# Patient Record
(demographics unavailable — no encounter records)

---

## 2024-10-18 NOTE — DISCUSSION/SUMMARY
[Normal Growth] : growth [Normal Development] : development [None] : No known medical problems [No Elimination Concerns] : elimination [No Feeding Concerns] : feeding [No Skin Concerns] : skin [Normal Sleep Pattern] : sleep [Family Support] : family support [Establishing Routines] : establishing routines [Feeding and Appetite Changes] : feeding and appetite changes [Establishing A Dental Home] : establishing a dental home [Safety] : safety [No Medications] : ~He/She~ is not on any medications [Parent/Guardian] : parent/guardian [] : The components of the vaccine(s) to be administered today are listed in the plan of care. The disease(s) for which the vaccine(s) are intended to prevent and the risks have been discussed with the caretaker.  The risks are also included in the appropriate vaccination information statements which have been provided to the patient's caregiver.  The caregiver has given consent to vaccinate. [FreeTextEntry1] : Transition to whole cow's milk. Continue table foods, 3 meals with 2-3 snacks per day. Incorporate up to 6 oz of flourinated water daily in a sippy cup. Brush teeth twice a day with soft toothbrush. Recommend visit to dentist. When in car, keep child in rear-facing car seats until age 2, or until  the maximum height and weight for seat is reached. Put baby to sleep in own crib with no loose or soft bedding. Lower crib matress. Help baby to maintain consistent daily routines and sleep schedule. Recognize stranger and separation anxiety. Ensure home is safe since baby is increasingly mobile. Be within arm's reach of baby at all times. Use consistent, positive discipline. Avoid screen time. Read aloud to baby.

## 2024-10-18 NOTE — HISTORY OF PRESENT ILLNESS
[Mother] : mother [Breast milk] : breast milk [Expressed Breast milk] : expressed breast milk [Hours between feeds ___] : Child is fed every [unfilled] hours [Fruit] : fruit [Vegetables] : vegetables [Meat] : meat [Dairy] : dairy [Baby food] : baby food [Finger food] : finger food [Table food] : table food [Vitamin ___] : Patient takes [unfilled] vitamin daily [___ stools per day] : [unfilled]  stools per day [Yellow] : stools are yellow color [Firm] : firm consistency [___ voids per day] : [unfilled] voids per day [Normal] : Normal [On back] : On back [In crib] : In crib [Wakes up at night] : Wakes up at night [Pacifier use] : Pacifier use [Sippy cup use] : Sippy cup use [Brushing teeth] : Brushing teeth [Bottle in bed] : Bottle in bed [Toothpaste] : Primary Fluoride Source: Toothpaste [Playtime] : Playtime  [No] : Not at  exposure [Water heater temperature set at <120 degrees F] : Water heater temperature set at <120 degrees F [Car seat in back seat] : Car seat in back seat [Smoke Detectors] : Smoke detectors [Carbon Monoxide Detectors] : Carbon monoxide detectors [NO] : No [Exposure to electronic nicotine delivery system] : No exposure to electronic nicotine delivery system [At risk for exposure to TB] : Not at risk for exposure to Tuberculosis

## 2024-10-18 NOTE — DEVELOPMENTAL MILESTONES
[Looks for hidden objects] : looks for hidden objects [Imitates new gestures] : imitates new gestures [Says "Dad" or "Mom" with meaning] : says "Dad" or "Mom" with meaning [Uses one word other than Mom or] : uses one word other than Mom or Dad or personal names [Follows a verbal command that] : follows a verbal command that includes a gesture [Takes first independent] : takes first independent steps [Stands without support] : stands without support [Drops object in a cup] : drops object in a cup [Picks up small object with 2 finger] : picks up small object with 2 finger pincer grasp [Picks up food and eats it] : picks up food and eats it [Normal Development] : Normal Development [None] : none [Yes] : Completed.

## 2024-10-18 NOTE — PHYSICAL EXAM
[Alert] : alert [Normocephalic] : normocephalic [Closed Anterior [place]] : closed anterior fontanelle [Red Reflex] : red reflex bilateral [PERRL] : PERRL [Normally Placed Ears] : normally placed ears [Auricles Well Formed] : auricles well formed [Clear Tympanic membranes] : clear tympanic membranes [Light reflex present] : light reflex present [Bony landmarks visible] : bony landmarks visible [Discharge] : no discharge [Nares Patent] : nares patent [Palate Intact] : palate intact [Uvula Midline] : uvula midline [Tooth Eruption] : tooth eruption [Supple, full passive range of motion] : supple, full passive range of motion [Palpable Masses] : no palpable masses [Symmetric Chest Rise] : symmetric chest rise [Clear to Auscultation Bilaterally] : clear to auscultation bilaterally [Regular Rate and Rhythm] : regular rate and rhythm [S1, S2 present] : S1, S2 present [Murmurs] : no murmurs [+2 Femoral Pulses] : (+) 2 femoral pulses [Soft] : soft [Tender] : nontender [Distended] : nondistended [Bowel Sounds] : normoactive bowel sounds [Hepatomegaly] : no hepatomegaly [Splenomegaly] : no splenomegaly [Normal External Genitalia] : normal external genitalia [Clitoromegaly] : no clitoromegaly [Normal Vaginal Introitus] : normal vaginal introitus [No Abnormal Lymph Nodes Palpated] : no abnormal lymph nodes palpated [Symmetric Abduction and Rotation of Hips] : symmetric abduction and rotation of hips [Allis Sign] : negative Allis sign [Straight] : straight [Cranial Nerves Grossly Intact] : cranial nerves grossly intact [Rash or Lesions] : no rash/lesions

## 2024-11-15 NOTE — HISTORY OF PRESENT ILLNESS
[FreeTextEntry6] : here for a follow up weight check child eating 3 home cooked meals daily and health snacks in bwtn  per mom alot more active, now fully walking 
I have reviewed and confirmed nurses' notes...

## 2024-11-15 NOTE — DISCUSSION/SUMMARY
[FreeTextEntry1] : weight increased since last visit discussed with mother that now that child is active it is normal to not rapidly be gaining weight f/u for 15 month wcc

## 2025-01-10 NOTE — PHYSICAL EXAM
[Alert] : alert [No Acute Distress] : no acute distress [Normocephalic] : normocephalic [Anterior [place] Closed] : anterior fontanelle closed [Red Reflex Bilateral] : red reflex bilateral [PERRL] : PERRL [Normally Placed Ears] : normally placed ears [Auricles Well Formed] : auricles well formed [Clear Tympanic membranes with present light reflex and bony landmarks] : clear tympanic membranes with present light reflex and bony landmarks [No Discharge] : no discharge [Nares Patent] : nares patent [Palate Intact] : palate intact [Uvula Midline] : uvula midline [Tooth Eruption] : tooth eruption  [Supple, full passive range of motion] : supple, full passive range of motion [No Palpable Masses] : no palpable masses [Symmetric Chest Rise] : symmetric chest rise [Clear to Auscultation Bilaterally] : clear to auscultation bilaterally [Regular Rate and Rhythm] : regular rate and rhythm [S1, S2 present] : S1, S2 present [No Murmurs] : no murmurs [+2 Femoral Pulses] : +2 femoral pulses [Soft] : soft [NonTender] : non tender [Non Distended] : non distended [Normoactive Bowel Sounds] : normoactive bowel sounds [No Hepatomegaly] : no hepatomegaly [No Splenomegaly] : no splenomegaly [Rodriguez 1] : Rodriguez 1 [No Clitoromegaly] : no clitoromegaly [Normal Vaginal Introitus] : normal vaginal introitus [Patent] : patent [Normally Placed] : normally placed [No Abnormal Lymph Nodes Palpated] : no abnormal lymph nodes palpated [No Clavicular Crepitus] : no clavicular crepitus [Negative White-Ortalani] : negative White-Ortalani [Symmetric Buttocks Creases] : symmetric buttocks creases [No Spinal Dimple] : no spinal dimple [NoTuft of Hair] : no tuft of hair [Cranial Nerves Grossly Intact] : cranial nerves grossly intact [No Rash or Lesions] : no rash or lesions

## 2025-01-10 NOTE — DEVELOPMENTAL MILESTONES
[Imitates scribbling] : imitates scribbling [Drinks from cup with little] : drinks from cup with little spilling [Points to ask for something] : points to ask for something or to get help [Uses 3 words other than names] : uses 3 words other than names [Speaks in sounds that seem like] : speaks in sounds that seem like an unknown language [Follows directions that do not] : follows direction that do not include a gesture [Looks when parent says,] : looks when parent says, "Where is...?" [Squats to  objects] : squats to  objects [Crawls up a few steps] : crawls up a few steps [Begins to run] : begins to run [Makes fredy with crayon] : makes fredy with dionneyon [Drops object into and takes object] : drops object into and takes object out of container [Normal Development] : Normal Development [None] : none

## 2025-01-10 NOTE — HISTORY OF PRESENT ILLNESS
[Mother] : mother [Fruit] : fruit [Vegetables] : vegetables [Meat] : meat [Cereal] : cereal [Eggs] : eggs [Finger Foods] : finger foods [Table food] : table food [___ stools per day] : [unfilled]  stools per day [Normal] : Normal [In crib] : In crib [Pacifier use] : Pacifier use [Brushing teeth] : Brushing teeth [Toothpaste] : Primary Fluoride Source: Toothpaste [Playtime] : Playtime [Temper Tantrums] : Temper tantrums [No] : Not at  exposure [Water heater temperature set at <120 degrees F] : Water heater temperature set at <120 degrees F [Car seat in back seat] : Car seat in back seat [Carbon Monoxide Detectors] : Carbon monoxide detectors [Smoke Detectors] : Smoke detectors [NO] : No [Up to date] : Up to date [Exposure to electronic nicotine delivery system] : No exposure to electronic nicotine delivery system

## 2025-01-10 NOTE — DISCUSSION/SUMMARY
[Normal Growth] : growth [Normal Development] : development [None] : No known medical problems [No Elimination Concerns] : elimination [No Feeding Concerns] : feeding [No Skin Concerns] : skin [Normal Sleep Pattern] : sleep [Communication and Social Development] : communication and social development [Sleep Routines and Issues] : sleep routines and issues [Temper Tantrums and Discipline] : temper tantrums and discipline [Healthy Teeth] : healthy teeth [Safety] : safety [No Medications] : ~He/She~ is not on any medications [Parent/Guardian] : parent/guardian [] : The components of the vaccine(s) to be administered today are listed in the plan of care. The disease(s) for which the vaccine(s) are intended to prevent and the risks have been discussed with the caretaker.  The risks are also included in the appropriate vaccination information statements which have been provided to the patient's caregiver.  The caregiver has given consent to vaccinate. [FreeTextEntry1] : Continue whole cow's milk. Continue table foods, 3 meals with 2-3 snacks per day. Incorporate fluorinated water daily in a sippy cup. Brush teeth twice a day with soft toothbrush. Recommend visit to dentist. When in car, keep child in rear-facing car seats until age 2, or until  the maximum height and weight for seat is reached. Put baby to sleep in own crib. Lower crib mattress. Help baby to maintain consistent daily routines and sleep schedule. Recognize stranger and separation anxiety. Ensure home is safe since baby is increasingly mobile. Be within arm's reach of baby at all times. Use consistent, positive discipline. Read aloud to baby. mom is weaning breastfeeding, is expecting  refused flu shot

## 2025-02-12 NOTE — PHYSICAL EXAM
[Clear Rhinorrhea] : clear rhinorrhea [Tooth Eruption] : tooth eruption  [Clear to Auscultation Bilaterally] : clear to auscultation bilaterally [NL] : warm, clear

## 2025-02-12 NOTE — HISTORY OF PRESENT ILLNESS
[FreeTextEntry6] : patient with on and off fevers since Monday afternoon, tmax 103  mother has a cold for 1 week  temp 99.1F minimal cough, no appetitie, but drinkin and making wet diapers  no diarrhea or vomiting  no nasal discharge

## 2025-03-19 NOTE — HISTORY OF PRESENT ILLNESS
[EENT/Resp Symptoms] : EENT/RESPIRATORY SYMPTOMS [Nasal congestion] : nasal congestion [Runny nose] : runny nose [Cough] : cough [___ Week(s)] : [unfilled] week(s) [Nebulizer: ___] : nebulizer: [unfilled] [FreeTextEntry6] : cough and nasal congestion for 2 weeks  [Known Exposure to COVID-19] : no known exposure to COVID-19 [Hx of recent COVID-19 infection] : no history of recent COVID-19 infection [Mucoid discharge] : mucoid discharge [Dry cough] : dry cough [Fever] : no fever [Wheezing] : no wheezing [Decreased Appetite] : no decreased appetite [Posttussive emesis] : no posttussive emesis [Vomiting] : no vomiting [Diarrhea] : no diarrhea [Decreased Urine Output] : no decreased urine output [Stable] : stable

## 2025-04-11 NOTE — HISTORY OF PRESENT ILLNESS
[Mother] : mother [Fruit] : fruit [Vegetables] : vegetables [Meat] : meat [Cereal] : cereal [Eggs] : eggs [Finger Foods] : finger foods [Table food] : table food [___ stools per day] : [unfilled]  stools per day [Firm] : firm consistency [In crib] : In crib [Pacifier use] : Pacifier use [Playtime] : Playtime  [Temper Tantrums] : Temper Tantrums [No] : Not at  exposure [Water heater temperature set at <120 degrees F] : Water heater temperature set at <120 degrees F [Car seat in back seat] : Car seat in back seat [Carbon Monoxide Detectors] : Carbon monoxide detectors [Smoke Detectors] : Smoke detectors [Up to date] : Up to date [NO] : No [Exposure to electronic nicotine delivery system] : No exposure to electronic nicotine delivery system [de-identified] : [place] or oat milk, 4 oz, water

## 2025-04-11 NOTE — DEVELOPMENTAL MILESTONES
[Yes: _______] : yes, [unfilled] [Engages with others for play] : engages with others for play [Help dress and undress self] : help dress and undress self [Points to object of interest to] : points to object of interest to draw attention to it [Begins to scoop with spoon] : begins to scoop with spoon [Walks up with 2 feet per step] : walks up with 2 feet per step with hand held [Sits in small chair] : sits in small chair [Carries toy while walking] : carries toy while walking [Scribbles spontaneously] : scribbles spontaneously [Throws small ball a few feet] : throws a small ball a few feet while standing [Passed] : passed [Yes] : Completed. [Points to pictures in book] : does not point to pictures in book [Turns and looks at adult if] : does not turn and looks at adult if something new happens [Uses 6 to 10 words other than] : does not use 6 to 10 words other than names [Identifies at least 2 body parts] : does not indentify at least 2 body parts [FreeTextEntry1] : refer to ei for expressive speech delay

## 2025-04-11 NOTE — PHYSICAL EXAM

## 2025-04-11 NOTE — DISCUSSION/SUMMARY
[Normal Growth] : growth [Normal Development] : development [None] : No known medical problems [No Elimination Concerns] : elimination [No Feeding Concerns] : feeding [No Skin Concerns] : skin [Normal Sleep Pattern] : sleep [No Medications] : ~He/She~ is not on any medications [Parent/Guardian] : parent/guardian [Family Support] : family support [Child Development and Behavior] : child development and behavior [Language Promotion/Hearing] : language promotion/hearing [[place] Training Readiness] : toliet training readiness [Safety] : safety [] : The components of the vaccine(s) to be administered today are listed in the plan of care. The disease(s) for which the vaccine(s) are intended to prevent and the risks have been discussed with the caretaker.  The risks are also included in the appropriate vaccination information statements which have been provided to the patient's caregiver.  The caregiver has given consent to vaccinate. [FreeTextEntry1] : Continue whole cow's milk. Continue table foods, 3 meals with 2-3 snacks per day. Incorporate flourinated water daily in a sippy cup. Brush teeth twice a day with soft toothbrush. Recommend visit to dentist. When in car, keep child in rear-facing car seats until age 2, or until  the maximum height and weight for seat is reached. Put todder to sleep in own bed or crib. Help toddler to maintain consistent daily routines and sleep schedule. Toilet training discussed. Recognize anxiety in new settings. Ensure home is safe. Be within arm's reach of toddler at all times. Use consistent, positive discipline. Read aloud to toddler. PCV given  Referral to dentist  Referral to Early Intervention for Expressive speech delay, Rukhsana wong, referral sent to agency

## 2025-05-28 NOTE — HISTORY OF PRESENT ILLNESS
[EENT/Resp Symptoms] : EENT/RESPIRATORY SYMPTOMS [Nasal congestion] : nasal congestion [Decreased appetite] : decreased appetite [Known Exposure to COVID-19] : no known exposure to COVID-19 [Hx of recent COVID-19 infection] : no history of recent COVID-19 infection [Sick Contacts: ___] : no sick contacts [Acetaminophen] : acetaminophen [Last dose: _____] : last dose: [unfilled] [Fever] : fever [Runny Nose] : no runny nose [Nasal Congestion] : nasal congestion [Cough] : cough [Decreased Appetite] : decreased appetite [Max Temp: ____] : Max temperature: [unfilled] [de-identified] : Pt been having fever since yesterday.

## 2025-05-29 NOTE — DISCUSSION/SUMMARY
[FreeTextEntry1] : Recommend using mist from a humidifier. Allow the child to breathe cool air during the night by opening a window or door. Fever can be treated with an over-the-counter medication such as acetaminophen or ibuprofen. Coughing can be treated with warm, clear fluids to loosen mucus on the vocal cords. Warm water, apple juice, or lemonade is safe for children older than four months. Frozen juice popsicles also can be given. Keep the child's head elevated. If the child's stridor does not improve contact health care provider immediately.  pred on hold, cont saline nebs

## 2025-05-29 NOTE — HISTORY OF PRESENT ILLNESS
[FreeTextEntry6] : here for f/u stridor  doing much better since yesterdays visit afebrile  doing saline nebs

## 2025-05-30 NOTE — DISCUSSION/SUMMARY
[FreeTextEntry1] : Dex given IM in office 02 stable  Saline nebs  tylenol for fever to er for resp distress, f/u tomorrow

## 2025-07-01 NOTE — DISCUSSION/SUMMARY
[FreeTextEntry1] : ns nebs every 2 hours start prednisolone return on thursday  Recommend using mist from a humidifier. Allow the child to breathe cool air during the night by opening a window or door. Fever can be treated with an over-the-counter medication such as acetaminophen or ibuprofen. Coughing can be treated with warm, clear fluids to loosen mucus on the vocal cords. Warm water, apple juice, or lemonade is safe for children older than four months. Frozen juice popsicles also can be given. Keep the child's head elevated. If the child's stridor does not improve contact health care provider immediately.

## 2025-07-01 NOTE — HISTORY OF PRESENT ILLNESS
[EENT/Resp Symptoms] : EENT/RESPIRATORY SYMPTOMS [Fever] : fever [Nasal congestion] : nasal congestion [Cough] : cough [FreeTextEntry6] : sick since last night  afebrile  runny nose

## 2025-07-01 NOTE — PHYSICAL EXAM
[Clear to Auscultation Bilaterally] : clear to auscultation bilaterally [NL] : warm, clear [FreeTextEntry7] : stridor